# Patient Record
Sex: FEMALE | Race: WHITE | NOT HISPANIC OR LATINO | Employment: STUDENT | URBAN - METROPOLITAN AREA
[De-identification: names, ages, dates, MRNs, and addresses within clinical notes are randomized per-mention and may not be internally consistent; named-entity substitution may affect disease eponyms.]

---
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
## 2025-07-10 NOTE — LETTER
Pt called stating she feels clammy and nervous, she is going to the restroom a lot more than usual, no chest pains, some stomach pains but when she uses the restroom those goes away. No diahrea. Pt was just seen in office yesterday. Please advise.    September 17, 2019      Desirae Pimentel  [id number] [id number]        Dear Desirae,         This letter is to inform you that the results of your recent throat culture are negative.  If you have any questions please call or make an appointment.          Sincerely,        Neftali Frost PA-C